# Patient Record
Sex: FEMALE | URBAN - METROPOLITAN AREA
[De-identification: names, ages, dates, MRNs, and addresses within clinical notes are randomized per-mention and may not be internally consistent; named-entity substitution may affect disease eponyms.]

---

## 2021-01-26 ENCOUNTER — OFFICE VISIT (OUTPATIENT)
Dept: URBAN - METROPOLITAN AREA CLINIC 13 | Facility: CLINIC | Age: 66
End: 2021-01-26
Payer: MEDICARE

## 2021-01-26 DIAGNOSIS — Z96.1 PRESENCE OF INTRAOCULAR LENS: ICD-10-CM

## 2021-01-26 DIAGNOSIS — H43.813 VITREOUS DEGENERATION, BILATERAL: ICD-10-CM

## 2021-01-26 DIAGNOSIS — H44.23 DEGENERATIVE MYOPIA, BILATERAL: Primary | ICD-10-CM

## 2021-01-26 PROCEDURE — 92014 COMPRE OPH EXAM EST PT 1/>: CPT | Performed by: OPHTHALMOLOGY

## 2021-01-26 PROCEDURE — 92134 CPTRZ OPH DX IMG PST SGM RTA: CPT | Performed by: OPHTHALMOLOGY

## 2021-01-26 ASSESSMENT — INTRAOCULAR PRESSURE
OD: 9
OS: 12

## 2021-01-26 NOTE — IMPRESSION/PLAN
Impression: Degenerative myopia, bilateral: H44.23. OU. Status: Chronic. Plan: Exam and OCT reveal myopic  degeneration in both eyes; no CNVM present. No IRF/SRF. Will observe. AMSLER grid testing encouraged. Patient to call with any sudden vision changes.  

RTC: 6-9 months DFE with OCT OU

## 2021-01-26 NOTE — IMPRESSION/PLAN
Impression: Presence of intraocular lens: Z96.1.
-s/p CE/PCIOL OU November 2020 Jake Rueda) Plan: Stable OU

## 2021-01-26 NOTE — IMPRESSION/PLAN
Impression: Nexdtve age-related mclr degn, bilateral, intermed dry stage: H35.3132. OU. Plan: Examination and OCT demonstrate stable myopic/macular degeneration OU. The patient was advised to continue AREDS 2 vitamin supplements. The patient was also advised to continue to monitor Amsler grid and visual acuity and call immediately with any changes.

## 2021-01-26 NOTE — IMPRESSION/PLAN
Impression: Vitreous degeneration, bilateral: H43.813. OU.
- syneresis OU Plan: No RD or RT on exam.  Precautions discussed with the patient.

## 2021-01-26 NOTE — IMPRESSION/PLAN
Impression: Glaucoma Suspect: H40.9. Plan: IOP ok.  FU with Dr Abner Billingsley who ruled out glaucoma

## 2021-10-13 ENCOUNTER — OFFICE VISIT (OUTPATIENT)
Dept: URBAN - METROPOLITAN AREA CLINIC 13 | Facility: CLINIC | Age: 66
End: 2021-10-13
Payer: MEDICARE

## 2021-10-13 DIAGNOSIS — H25.13 AGE-RELATED NUCLEAR CATARACT, BILATERAL: ICD-10-CM

## 2021-10-13 DIAGNOSIS — H40.9 GLAUCOMA: ICD-10-CM

## 2021-10-13 DIAGNOSIS — H35.3132 NEXDTVE AGE-RELATED MCLR DEGN, BILATERAL, INTERMED DRY STAGE: ICD-10-CM

## 2021-10-13 PROCEDURE — 92014 COMPRE OPH EXAM EST PT 1/>: CPT | Performed by: OPHTHALMOLOGY

## 2021-10-13 PROCEDURE — 92134 CPTRZ OPH DX IMG PST SGM RTA: CPT | Performed by: OPHTHALMOLOGY

## 2021-10-13 ASSESSMENT — INTRAOCULAR PRESSURE
OD: 9
OS: 10

## 2021-10-13 NOTE — IMPRESSION/PLAN
Impression: Presence of intraocular lens: Z96.1.
-s/p CE/PCIOL OU November 2020 Abby hCavez) Plan: Stable OU

## 2021-10-13 NOTE — IMPRESSION/PLAN
Impression: Age-related nuclear cataract, bilateral: H25.13. OU. Status: Symptomatic. Plan: May be becoming visually significant. Patient complains of glare. Patient elects continued observation.

## 2021-10-13 NOTE — IMPRESSION/PLAN
Impression: Degenerative myopia, bilateral: H44.23. OU. Status: Chronic. Plan: Exam and OCT reveal myopic  degeneration in both eyes; no CNVM present. No IRF/SRF. Will observe. AMSLER grid testing encouraged. Patient to call with any sudden vision changes.  

RTC: 12 months DFE with OCT OU

## 2022-10-11 ENCOUNTER — OFFICE VISIT (OUTPATIENT)
Dept: URBAN - METROPOLITAN AREA CLINIC 13 | Facility: CLINIC | Age: 67
End: 2022-10-11
Payer: MEDICARE

## 2022-10-11 DIAGNOSIS — Z96.1 PRESENCE OF INTRAOCULAR LENS: ICD-10-CM

## 2022-10-11 DIAGNOSIS — H44.23 DEGENERATIVE MYOPIA, BILATERAL: Primary | ICD-10-CM

## 2022-10-11 DIAGNOSIS — H35.3132 NEXDTVE AGE-RELATED MCLR DEGN, BILATERAL, INTERMED DRY STAGE: ICD-10-CM

## 2022-10-11 DIAGNOSIS — H40.9 GLAUCOMA: ICD-10-CM

## 2022-10-11 DIAGNOSIS — H25.13 AGE-RELATED NUCLEAR CATARACT, BILATERAL: ICD-10-CM

## 2022-10-11 DIAGNOSIS — H43.813 VITREOUS DEGENERATION, BILATERAL: ICD-10-CM

## 2022-10-11 DIAGNOSIS — H02.409 PTOSIS OF EYELID: ICD-10-CM

## 2022-10-11 PROCEDURE — 92134 CPTRZ OPH DX IMG PST SGM RTA: CPT | Performed by: OPHTHALMOLOGY

## 2022-10-11 PROCEDURE — 92014 COMPRE OPH EXAM EST PT 1/>: CPT | Performed by: OPHTHALMOLOGY

## 2022-10-11 ASSESSMENT — INTRAOCULAR PRESSURE
OS: 14
OD: 15

## 2022-10-11 NOTE — IMPRESSION/PLAN
Impression: Presence of intraocular lens: Z96.1.
-s/p CE/PCIOL OU November 2020 Maki Tucker Plan: Stable OU

## 2023-11-30 ENCOUNTER — OFFICE VISIT (OUTPATIENT)
Dept: URBAN - METROPOLITAN AREA CLINIC 13 | Facility: CLINIC | Age: 68
End: 2023-11-30
Payer: MEDICARE

## 2023-11-30 DIAGNOSIS — Z96.1 PRESENCE OF INTRAOCULAR LENS: ICD-10-CM

## 2023-11-30 DIAGNOSIS — H44.23 DEGENERATIVE MYOPIA, BILATERAL: Primary | ICD-10-CM

## 2023-11-30 DIAGNOSIS — H43.813 VITREOUS DEGENERATION, BILATERAL: ICD-10-CM

## 2023-11-30 DIAGNOSIS — H40.9 GLAUCOMA: ICD-10-CM

## 2023-11-30 DIAGNOSIS — H35.3132 NONEXUDATIVE AGE-RELATED MACULAR DEGENERATION, BILATERAL, INTERMEDIATE DRY STAGE: ICD-10-CM

## 2023-11-30 PROCEDURE — 92014 COMPRE OPH EXAM EST PT 1/>: CPT | Performed by: OPHTHALMOLOGY

## 2023-11-30 PROCEDURE — 92134 CPTRZ OPH DX IMG PST SGM RTA: CPT | Performed by: OPHTHALMOLOGY

## 2023-11-30 ASSESSMENT — INTRAOCULAR PRESSURE
OS: 11
OD: 10

## 2025-02-18 ENCOUNTER — OFFICE VISIT (OUTPATIENT)
Dept: URBAN - METROPOLITAN AREA CLINIC 13 | Facility: CLINIC | Age: 70
End: 2025-02-18
Payer: MEDICARE

## 2025-02-18 DIAGNOSIS — Z96.1 PRESENCE OF INTRAOCULAR LENS: ICD-10-CM

## 2025-02-18 DIAGNOSIS — H43.813 VITREOUS DEGENERATION, BILATERAL: ICD-10-CM

## 2025-02-18 DIAGNOSIS — H44.23 DEGENERATIVE MYOPIA, BILATERAL: Primary | ICD-10-CM

## 2025-02-18 DIAGNOSIS — H40.9 GLAUCOMA: ICD-10-CM

## 2025-02-18 DIAGNOSIS — H35.3133 NEXDTVE AGE-REL MCLR DEGN, BI, ADV ATRPC WITHOUT SBFVL INVL: ICD-10-CM

## 2025-02-18 PROCEDURE — 92134 CPTRZ OPH DX IMG PST SGM RTA: CPT | Performed by: OPHTHALMOLOGY

## 2025-02-18 PROCEDURE — 92014 COMPRE OPH EXAM EST PT 1/>: CPT | Performed by: OPHTHALMOLOGY

## 2025-02-18 ASSESSMENT — INTRAOCULAR PRESSURE
OD: 11
OS: 11